# Patient Record
Sex: FEMALE | Race: BLACK OR AFRICAN AMERICAN | NOT HISPANIC OR LATINO | Employment: OTHER | URBAN - METROPOLITAN AREA
[De-identification: names, ages, dates, MRNs, and addresses within clinical notes are randomized per-mention and may not be internally consistent; named-entity substitution may affect disease eponyms.]

---

## 2017-01-30 ENCOUNTER — TELEPHONE (OUTPATIENT)
Dept: PODIATRY | Facility: CLINIC | Age: 82
End: 2017-01-30

## 2017-01-30 NOTE — TELEPHONE ENCOUNTER
----- Message from Patricia Perez sent at 1/30/2017  2:34 PM CST -----  Contact: romi [phone]  Need to speak with doctor in reference to another doctor.

## 2017-03-23 ENCOUNTER — OFFICE VISIT (OUTPATIENT)
Dept: PODIATRY | Facility: CLINIC | Age: 82
End: 2017-03-23
Payer: MEDICARE

## 2017-03-23 VITALS
HEART RATE: 81 BPM | SYSTOLIC BLOOD PRESSURE: 141 MMHG | DIASTOLIC BLOOD PRESSURE: 87 MMHG | WEIGHT: 140 LBS | HEIGHT: 60 IN | BODY MASS INDEX: 27.48 KG/M2

## 2017-03-23 DIAGNOSIS — L97.522 ISCHEMIC TOE ULCER, LEFT, WITH FAT LAYER EXPOSED: ICD-10-CM

## 2017-03-23 DIAGNOSIS — Z89.421 HISTORY OF AMPUTATION OF LESSER TOE OF RIGHT FOOT: ICD-10-CM

## 2017-03-23 DIAGNOSIS — L84 CORN OR CALLUS: ICD-10-CM

## 2017-03-23 DIAGNOSIS — I73.9 PVD (PERIPHERAL VASCULAR DISEASE): Primary | ICD-10-CM

## 2017-03-23 DIAGNOSIS — B35.1 ONYCHOMYCOSIS DUE TO DERMATOPHYTE: ICD-10-CM

## 2017-03-23 PROCEDURE — 11721 DEBRIDE NAIL 6 OR MORE: CPT | Mod: GW,59,Q8,S$GLB | Performed by: PODIATRIST

## 2017-03-23 PROCEDURE — 1160F RVW MEDS BY RX/DR IN RCRD: CPT | Mod: S$GLB,,, | Performed by: PODIATRIST

## 2017-03-23 PROCEDURE — 99999 PR PBB SHADOW E&M-EST. PATIENT-LVL III: CPT | Mod: PBBFAC,,, | Performed by: PODIATRIST

## 2017-03-23 PROCEDURE — 1126F AMNT PAIN NOTED NONE PRSNT: CPT | Mod: S$GLB,,, | Performed by: PODIATRIST

## 2017-03-23 PROCEDURE — 1159F MED LIST DOCD IN RCRD: CPT | Mod: S$GLB,,, | Performed by: PODIATRIST

## 2017-03-23 PROCEDURE — 11056 PARNG/CUTG B9 HYPRKR LES 2-4: CPT | Mod: GW,Q8,S$GLB, | Performed by: PODIATRIST

## 2017-03-23 PROCEDURE — 99213 OFFICE O/P EST LOW 20 MIN: CPT | Mod: GW,25,S$GLB, | Performed by: PODIATRIST

## 2017-03-23 PROCEDURE — 1157F ADVNC CARE PLAN IN RCRD: CPT | Mod: S$GLB,,, | Performed by: PODIATRIST

## 2017-03-23 NOTE — MR AVS SNAPSHOT
[facility]  [address]  [place] [address]  Phone: [phone]                  Ramona Grijalva   3/23/2017 3:15 PM   Office Visit    Description:  Female : 1920   Provider:  Christopher Egan DPM   Department:  [facility]           Reason for Visit     Diabetic Foot Exam     Nail Care     Foot Swelling           Diagnoses this Visit        Comments    PVD (peripheral vascular disease)    -  Primary     History of amputation of lesser toe of right foot         Ischemic toe ulcer, left, with fat layer exposed                To Do List           Future Appointments        Provider Department Dept Phone    6/15/2017 12:00 PM Delonte Santos MD [facility] - Internal Medicine [phone]    2017 1:00 PM Christopher Egan DPM [facility] [phone]      Goals (5 Years of Data)     None      [facility] On [facility]     [facility] On Call Nurse Care Line -  Assistance  Registered nurses in the [facility] On Call Center provide clinical advisement, health education, appointment booking, and other advisory services.  Call for this free service at [phone].             Medications           Message regarding Medications     Verify the changes and/or additions to your medication regime listed below are the same as discussed with your clinician today.  If any of these changes or additions are incorrect, please notify your healthcare provider.             Verify that the below list of medications is an accurate representation of the medications you are currently taking.  If none reported, the list may be blank. If incorrect, please contact your healthcare provider. Carry this list with you in case of emergency.           Current Medications     anastrozole (ARIMIDEX) 1 mg Tab Take 1 tablet by mouth Daily.    ascorbic acid (VITAMIN C) 500 MG tablet Take 500 mg by mouth once daily.    aspirin (ECOTRIN) 325 MG EC tablet Take 325 mg by mouth once daily.      bimatoprost (LUMIGAN) 0.03 %  ophthalmic drops Place 1 drop into both eyes every evening.      bisacodyl (DULCOLAX) 10 mg Supp Place 10 mg rectally daily as needed.      cilostazol (PLETAL) 50 MG Tab Take 2 tablets (100 mg total) by mouth 2 (two) times daily.    clopidogrel (PLAVIX) 75 mg tablet Take 75 mg by mouth once daily.      docusate sodium (COLACE) 50 MG capsule Take 50 mg by mouth once daily.    hydrochlorothiazide (MICROZIDE) 12.5 mg capsule Take 12.5 mg by mouth once daily.      losartan (COZAAR) 50 MG tablet Take 50 mg by mouth once daily.      simvastatin (ZOCOR) 40 MG tablet Take 40 mg by mouth every evening.      temazepam (RESTORIL) 15 mg Cap     tramadol (ULTRAM) 50 mg tablet     TRAVATAN Z 0.004 % Drop     levetiracetam (KEPPRA) 500 MG Tab Take 1 tablet (500 mg total) by mouth 2 (two) times daily.           Clinical Reference Information           Your Vitals Were     BP Pulse Height Weight BMI    141/87 81 5' (1.524 m) 63.5 kg (140 lb) 27.34 kg/m2      Blood Pressure          Most Recent Value    BP  (!)  141/87      Allergies as of 3/23/2017     No Known Allergies      Immunizations Administered on Date of Encounter - 3/23/2017     None      [facility] Sign-Up     Activating your [facility] account is as easy as 1-2-3!     1) Visit [email or web address], select Sign Up Now, enter this activation code and your date of birth, then select Next.  [email or web address]  Expires: 5/7/2017  3:26 PM      2) Create a username and password to use when you visit [facility] in the future and select a security question in case you lose your password and select Next.    3) Enter your e-mail address and click Sign Up!    Additional Information  If you have questions, please e-mail [email or web address] or call [phone] to talk to our [facility] staff. Remember, [facility] is NOT to be used for urgent needs. For medical emergencies, dial 911.         Language Assistance Services     ATTENTION: Language assistance services are available, free of charge.  Please call [phone].      ATENCIÓN: Si habla español, tiene a lópez disposición servicios gratuitos de asistencia lingüística. Llame al [phone].     CHÚ Ý: N?u b?n nói Ti?ng Vi?t, có các d?ch v? h? tr? ngôn ng? mi?n phí dành cho b?n. G?i s? [phone].         [facility] complies with applicable Federal civil rights laws and does not discriminate on the basis of race, color, national origin, age, disability, or sex.

## 2017-03-23 NOTE — PROGRESS NOTES
Subjective:     Ramona Grijalva is a 89 y.o. female  who presents to the clinic for evaluation and treatment of high risk feet. Ramona has a past medical history of Anticoagulant long-term use; Aortic valve sclerosis; Arthritis; Breast cancer; Claudication; Cough; Dizziness; Hypertension; Seizures; Stroke; Tachycardia; and URI (upper respiratory infection). The patient's chief complaint is elongated toenail and left great toe blister x 3 weeks.  Her daughter has been cleaning the area with hydrogen peroxide and applying Neosporin and cover the area with a bandaid.        This patient has documented high risk feet requiring routine maintenance secondary to peripheral vascular disease.   history of right 5th toe amputation.     9/27/16: follow up for right great toe ulcer. Patients family member states it was caused by cutting toenail too close previously. No other complaints today. Patient resting in wheelchair. Family member present.     03/23/2017: patient returns 6 months after previous appointment despite requiring recheck of R great toe wound 2 weeks after last appointment. Wound still present. Family helping clean feet and applying Neosporin and bandaid to the wound. Here for overall reassessment of wound and toenail trimming. Patient wheelchair bound due to weakness.     PCP:  Antoine Goodson MD   Date Last Seen by PCP:   Chief Complaint   Patient presents with    Diabetic Foot Exam    Nail Care    Foot Swelling     rt foot              Past Medical History:   Diagnosis Date    Anticoagulant long-term use     Aortic valve sclerosis     Arthritis     Breast cancer     right breast    Claudication     RIGHT LEG BLOCKAGE    Cough     Dizziness     Hypertension     Seizures     Stroke     Tachycardia     URI (upper respiratory infection)          Current Outpatient Prescriptions on File Prior to Visit   Medication Sig Dispense Refill    anastrozole (ARIMIDEX) 1 mg Tab Take 1 tablet by mouth Daily.       ascorbic acid (VITAMIN C) 500 MG tablet Take 500 mg by mouth once daily.      aspirin (ECOTRIN) 325 MG EC tablet Take 325 mg by mouth once daily.        bimatoprost (LUMIGAN) 0.03 % ophthalmic drops Place 1 drop into both eyes every evening.        bisacodyl (DULCOLAX) 10 mg Supp Place 10 mg rectally daily as needed.        cilostazol (PLETAL) 50 MG Tab Take 2 tablets (100 mg total) by mouth 2 (two) times daily. 60 tablet 3    clopidogrel (PLAVIX) 75 mg tablet Take 75 mg by mouth once daily.        docusate sodium (COLACE) 50 MG capsule Take 50 mg by mouth once daily.      hydrochlorothiazide (MICROZIDE) 12.5 mg capsule Take 12.5 mg by mouth once daily.        losartan (COZAAR) 50 MG tablet Take 50 mg by mouth once daily.        simvastatin (ZOCOR) 40 MG tablet Take 40 mg by mouth every evening.        temazepam (RESTORIL) 15 mg Cap       tramadol (ULTRAM) 50 mg tablet       TRAVATAN Z 0.004 % Drop       levetiracetam (KEPPRA) 500 MG Tab Take 1 tablet (500 mg total) by mouth 2 (two) times daily. 60 tablet 11     No current facility-administered medications on file prior to visit.          Review of patient's allergies indicates:  No Known Allergies          Review of Systems   Constitution: Negative for chills, fever and night sweats.   Cardiovascular: Positive for leg swelling. Negative for chest pain.   Skin: Positive for poor wound healing.   Musculoskeletal: Negative for joint pain and joint swelling.   Gastrointestinal: Positive for constipation (off and on).   Neurological: Positive for numbness and seizures.   Psychiatric/Behavioral: Positive for altered mental status and memory loss.           Objective:        Vitals:    03/23/17 1446   BP: (!) 141/87   Pulse: 81   Weight: 63.5 kg (140 lb)   Height: 5' (1.524 m)             Physical Exam   Constitutional: She appears well-developed and well-nourished. No distress.     Cardiovascular: Normal rate.  Dorsalis pedis and posterior tibial pulses are  non-palpable Bilaterally. Toes are cool to touch. Feet are warm proximally.There is decreased digital hair. Skin is very atrophic, hyperpigmented, and mildly edematous.     Musculoskeletal: Normal range of motion. Edema: mild in feet. Absent right fifth toe due to amputation. Wheelchair bound due to lower extremity weakness, generalized.      Neurological: She is alert. She exhibits normal muscle tone.   Neurologic: [place]-Pop 5.07 monofilamant testing is absent Pankaj feet. Sharp/dull sensation absent Bilaterally. Light touch absent Bilaterally.     Skin: Skin is warm and dry. No rash noted. She is not diaphoretic. No erythema. No pallor.     Skin shiny, thin, atrophic with loss of pedal hair b/l.   Toenails 1-5 L and 1-4 R elongated, dystrophic, discolored with subungual debris. Hyperkeratotic lesion noted to lateral distal aspect of b/l 5th digits adjacent to toenails. Stable without open wounds at 5th toes.     Small wound noted to distal tip of right hallux just distal to nail plate. Fibrous wound bed measuring 0.5 x 0.5 x 0.3cm down to level of subcutaneous tissue. No erythema, odor or drainage noted. No signs of infection.             Assessment / Plan:       Orrajat was seen today for diabetic foot exam, nail care and foot swelling.    Diagnoses and all orders for this visit:    PVD (peripheral vascular disease)    History of amputation of lesser toe of right foot    Ischemic toe ulcer, left, with fat layer exposed    Onychomycosis due to dermatophyte    Corn or callus        · Wound cleaned with sterile normal saline; Betadine applied and bandaid.  Change daily at home. Get OTC betadine and cotton balls to apply. Do not wrap toe with any dressing. Apply loose bandaid over toe, do not wrap around toe.   · Protect feet to prevent injury   Monitor wound daily for problems. Return immediately if any signs of infection are encountered including pus, drainage, odor, severe pain, fever, chills.   · Utilize Darco  shoe at all times for added protection.   · With patient's permission, nails were aggressively reduced and debrided 1,2,3,4, 5 R and 1,2, 3,4,5 L and filed to their soft tissue attachment mechanically and with electric , removing all offending nail and debris. Utilizing a #15 scalpel, I trimmed the corns and calluses at the lateral b/l 5th toe adjacent to nail bed.    Patient tolerated this well and no blood was drawn. Patient reports relief following the procedure.       F/u 2 months, sooner PRN for follow up left great toe ulcer. With hx of PAD, patient high risk for losing toe if this becomes infected. Will keep close eye on toe. She has had previous vascular interventions. Will monitor toe and consider vascular referral if no improvement at next visit.   Assistance provided in finding new PCP at [facility]. Set up with Dr. Jean Baptiste.

## 2017-06-15 ENCOUNTER — OFFICE VISIT (OUTPATIENT)
Dept: INTERNAL MEDICINE | Facility: CLINIC | Age: 82
End: 2017-06-15
Payer: MEDICARE

## 2017-06-15 VITALS
WEIGHT: 140 LBS | SYSTOLIC BLOOD PRESSURE: 118 MMHG | HEIGHT: 60 IN | DIASTOLIC BLOOD PRESSURE: 72 MMHG | BODY MASS INDEX: 27.48 KG/M2

## 2017-06-15 DIAGNOSIS — I10 ESSENTIAL HYPERTENSION: Primary | ICD-10-CM

## 2017-06-15 DIAGNOSIS — F03.91 DEMENTIA WITH BEHAVIORAL DISTURBANCE, UNSPECIFIED DEMENTIA TYPE: ICD-10-CM

## 2017-06-15 DIAGNOSIS — I63.9 CEREBROVASCULAR ACCIDENT (CVA), UNSPECIFIED MECHANISM: ICD-10-CM

## 2017-06-15 PROCEDURE — 1159F MED LIST DOCD IN RCRD: CPT | Mod: S$GLB,,, | Performed by: INTERNAL MEDICINE

## 2017-06-15 PROCEDURE — 99204 OFFICE O/P NEW MOD 45 MIN: CPT | Mod: GW,S$GLB,, | Performed by: INTERNAL MEDICINE

## 2017-06-15 PROCEDURE — 99999 PR PBB SHADOW E&M-EST. PATIENT-LVL III: CPT | Mod: PBBFAC,,, | Performed by: INTERNAL MEDICINE

## 2017-06-15 PROCEDURE — 1126F AMNT PAIN NOTED NONE PRSNT: CPT | Mod: S$GLB,,, | Performed by: INTERNAL MEDICINE

## 2017-06-15 RX ORDER — SIMVASTATIN 40 MG/1
40 TABLET, FILM COATED ORAL NIGHTLY
Qty: 90 TABLET | Refills: 11 | Status: SHIPPED | OUTPATIENT
Start: 2017-06-15 | End: 2018-07-29 | Stop reason: SDUPTHER

## 2017-06-15 RX ORDER — LOSARTAN POTASSIUM 25 MG/1
25 TABLET ORAL DAILY
Qty: 90 TABLET | Refills: 11 | Status: SHIPPED | OUTPATIENT
Start: 2017-06-15 | End: 2018-08-24 | Stop reason: SDUPTHER

## 2017-06-15 RX ORDER — LOSARTAN POTASSIUM 25 MG/1
TABLET ORAL
COMMUNITY
Start: 2017-06-03 | End: 2017-06-15 | Stop reason: SDUPTHER

## 2017-06-15 RX ORDER — LACTULOSE 10 G/15ML
SOLUTION ORAL; RECTAL
Refills: 0 | COMMUNITY
Start: 2017-05-24

## 2017-06-15 NOTE — PROGRESS NOTES
Subjective:       Patient ID: Ramona Grijalva is a 89 y.o. female.    Chief Complaint: Establish Care    Had a recent fall. No complaints or inj from.    Gets agitated at times from dementia.    Has concerned care/hospice care for dementia.      Review of Systems   Constitutional: Negative for activity change, fatigue, fever and unexpected weight change.   HENT: Negative for congestion.    Respiratory: Negative for cough, choking, chest tightness and shortness of breath.    Cardiovascular: Negative for chest pain, palpitations and leg swelling.   Gastrointestinal: Negative for abdominal distention and abdominal pain.   Genitourinary: Negative for decreased urine volume.   Musculoskeletal: Positive for gait problem. Negative for arthralgias and back pain.   Skin: Negative for rash.   Neurological: Positive for weakness (from strokes).   Psychiatric/Behavioral: Positive for agitation (easily calms down), behavioral problems and confusion. Negative for dysphoric mood.       Objective:      Physical Exam   Constitutional: She appears well-developed and well-nourished. No distress.   Calm in wheelchair, minimal responses to questions, unintelligible   HENT:   Head: Normocephalic and atraumatic.   Mouth/Throat: No oropharyngeal exudate.   Eyes: EOM are normal. Pupils are equal, round, and reactive to light. Left eye exhibits no discharge.   Cardiovascular: Normal rate, regular rhythm and normal heart sounds.    Pulmonary/Chest: Effort normal and breath sounds normal.   Abdominal: Soft. Bowel sounds are normal. She exhibits no distension. There is no tenderness.   Neurological: She is alert.   L sided hemiparesis, mild R sided as well, but L>R.    Does not speak for herself.     unintelligible   Skin: No rash noted. She is not diaphoretic.   Psychiatric: She has a normal mood and affect. Her behavior is normal.       Assessment:       1. Essential hypertension    2. Cerebrovascular accident (CVA), unspecified mechanism    3.  Dementia with behavioral disturbance, unspecified dementia type        Plan:       Ramona was seen today for establish care.    Diagnoses and all orders for this visit:    Essential hypertension  -     losartan (COZAAR) 25 MG tablet; Take 1 tablet (25 mg total) by mouth once daily.  At goal    Cerebrovascular accident (CVA), unspecified mechanism  -     losartan (COZAAR) 25 MG tablet; Take 1 tablet (25 mg total) by mouth once daily.  -     simvastatin (ZOCOR) 40 MG tablet; Take 1 tablet (40 mg total) by mouth every evening.  -     CBC auto differential; Future  -     Comprehensive metabolic panel; Future  -     Lipid panel; Future    Dementia with behavioral disturbance, unspecified dementia type  Vascular dementia    Health Maintenance       Date Due Completion Date    Eye Exam 01/20/1930 ---    TETANUS VACCINE 01/20/1938 ---    Zoster Vaccine 01/20/1980 ---    Pneumococcal (65+) (1 of 2 - PCV13) 01/20/1985 ---    Hemoglobin A1c 10/25/2013 4/25/2013    Foot Exam 06/15/2017 6/15/2016    Influenza Vaccine 08/01/2017 11/19/2013    Lipid Panel 06/15/2018 6/15/2017      Check in re vaccines next time      Return in about 6 months (around 12/15/2017).'

## 2017-06-21 ENCOUNTER — TELEPHONE (OUTPATIENT)
Dept: INTERNAL MEDICINE | Facility: CLINIC | Age: 82
End: 2017-06-21

## 2017-06-21 NOTE — TELEPHONE ENCOUNTER
----- Message from Vanda Ni sent at 6/21/2017  2:04 PM CDT -----  Contact: [facility]/jamaal/[phone]  Nurse called in regards to getting a copy of the pt lab work. She has a visit with them on tomorrow. Fax number [phone]        Please advise

## 2017-06-27 ENCOUNTER — OFFICE VISIT (OUTPATIENT)
Dept: PODIATRY | Facility: CLINIC | Age: 82
End: 2017-06-27
Payer: MEDICARE

## 2017-06-27 VITALS
DIASTOLIC BLOOD PRESSURE: 68 MMHG | TEMPERATURE: 98 F | WEIGHT: 139 LBS | SYSTOLIC BLOOD PRESSURE: 120 MMHG | HEART RATE: 90 BPM | HEIGHT: 60 IN | BODY MASS INDEX: 27.29 KG/M2

## 2017-06-27 DIAGNOSIS — R60.0 BILATERAL LOWER EXTREMITY EDEMA: ICD-10-CM

## 2017-06-27 DIAGNOSIS — B35.1 ONYCHOMYCOSIS DUE TO DERMATOPHYTE: ICD-10-CM

## 2017-06-27 DIAGNOSIS — L97.522 TOE ULCER, LEFT, WITH FAT LAYER EXPOSED: ICD-10-CM

## 2017-06-27 DIAGNOSIS — I73.9 PAD (PERIPHERAL ARTERY DISEASE): Primary | ICD-10-CM

## 2017-06-27 DIAGNOSIS — L84 CORN OR CALLUS: ICD-10-CM

## 2017-06-27 PROCEDURE — 99999 PR PBB SHADOW E&M-EST. PATIENT-LVL III: CPT | Mod: PBBFAC,,, | Performed by: PODIATRIST

## 2017-06-27 PROCEDURE — 11056 PARNG/CUTG B9 HYPRKR LES 2-4: CPT | Mod: Q7,GW,S$GLB, | Performed by: PODIATRIST

## 2017-06-27 PROCEDURE — 11721 DEBRIDE NAIL 6 OR MORE: CPT | Mod: 59,Q7,GW,S$GLB | Performed by: PODIATRIST

## 2017-06-27 PROCEDURE — 99499 UNLISTED E&M SERVICE: CPT | Mod: S$GLB,,, | Performed by: PODIATRIST

## 2017-06-28 NOTE — PROGRESS NOTES
Subjective:     Ramona Grijalva is a 89 y.o. female  who presents to the clinic for evaluation and treatment of high risk feet. Ramona has a past medical history of Anticoagulant long-term use; Aortic valve sclerosis; Arthritis; Breast cancer; Claudication; Cough; Dizziness; Hypertension; Seizures; Stroke; Tachycardia; and URI (upper respiratory infection). The patient's chief complaint is follow up for left great toe ulceration. Remains stable but open per patients family member.  Her daughter has been cleaning the area with hydrogen peroxide and applying Neosporin and cover the area with a bandaid from time to time. Also here for toenail trimming as they are elongated and beginning to bother her with pain.         PCP:  Delonte Santos MD   Date Last Seen by PCP:   Chief Complaint   Patient presents with    PCP     Danielle 06/15/2017    Follow-up              Past Medical History:   Diagnosis Date    Anticoagulant long-term use     Aortic valve sclerosis     Arthritis     Breast cancer     right breast    Claudication     RIGHT LEG BLOCKAGE    Cough     Dementia     Dizziness     Hypertension     Seizures     Stroke     L sided hemiparesis    Tachycardia     URI (upper respiratory infection)          Current Outpatient Prescriptions on File Prior to Visit   Medication Sig Dispense Refill    anastrozole (ARIMIDEX) 1 mg Tab Take 1 tablet by mouth Daily.      aspirin (ECOTRIN) 325 MG EC tablet Take 325 mg by mouth once daily.        bimatoprost (LUMIGAN) 0.03 % ophthalmic drops Place 1 drop into both eyes every evening.        bisacodyl (DULCOLAX) 10 mg Supp Place 10 mg rectally daily as needed.        lactulose (CHRONULAC) 10 gram/15 mL solution TAKE 30ml BY MOUTH DAILY as needed FOR CONSTIPATION  0    losartan (COZAAR) 25 MG tablet Take 1 tablet (25 mg total) by mouth once daily. 90 tablet 11    simvastatin (ZOCOR) 40 MG tablet Take 1 tablet (40 mg total) by mouth every evening. 90 tablet 11     temazepam (RESTORIL) 15 mg Cap       TRAVATAN Z 0.004 % Drop        No current facility-administered medications on file prior to visit.          Review of patient's allergies indicates:  No Known Allergies          Review of Systems   Constitution: Negative for chills, fever and night sweats.   Cardiovascular: Positive for leg swelling. Negative for chest pain.   Skin: Positive for poor wound healing.   Musculoskeletal: Negative for joint pain and joint swelling.   Gastrointestinal: Positive for constipation (off and on).   Neurological: Positive for numbness and seizures.   Psychiatric/Behavioral: Positive for altered mental status and memory loss.           Objective:        Vitals:    06/27/17 1335   BP: 120/68   Pulse: 90   Temp: 98.3 °F (36.8 °C)   TempSrc: Oral   Weight: 63 kg (139 lb)   Height: 5' (1.524 m)             Physical Exam   Constitutional: She appears well-developed and well-nourished. No distress.     Cardiovascular: Normal rate.  Dorsalis pedis and posterior tibial pulses are non-palpable Bilaterally. Toes are cool to touch. Feet are warm proximally.There is decreased digital hair. Skin is very atrophic, hyperpigmented, and mildly edematous.     Musculoskeletal: Normal range of motion. Edema: mild in feet. Absent right fifth toe due to amputation. Wheelchair bound due to lower extremity weakness, generalized.      Neurological: She is alert. She exhibits normal muscle tone.   Neurologic: [place]-Pop 5.07 monofilamant testing is absent Pankaj feet. Sharp/dull sensation absent Bilaterally. Light touch absent Bilaterally.     Skin: Skin is warm and dry. No rash noted. She is not diaphoretic. No erythema. No pallor.     Skin shiny, thin, atrophic with loss of pedal hair b/l.   Toenails 1-5 L and 1-4 R elongated, dystrophic, discolored with subungual debris. Hyperkeratotic lesion noted to lateral distal aspect of b/l 5th digits adjacent to toenails. Stable without open wounds at 5th toes.      Small wound noted to distal tip of right hallux just distal to nail plate. Fibrous wound bed measuring 0.5 x 0.5 x 0.3cm down to level of subcutaneous tissue. No erythema, odor or drainage noted. No signs of infection. Surrounding skin hyperpigmented, cold, dry, atrophic.             Assessment / Plan:       Ramona was seen today for pcp and follow-up.    Diagnoses and all orders for this visit:    PAD (peripheral artery disease)    Bilateral lower extremity edema    Onychomycosis due to dermatophyte    Corn or callus    Toe ulcer, left, with fat layer exposed        · Wound cleaned with sterile normal saline; Betadine applied and bandaid.  Change daily at home. Get OTC betadine and cotton balls to apply. Do not wrap toe with any dressing. Apply loose bandaid over toe, do not wrap around toe.   · Protect feet to prevent injury   Monitor wound daily for problems. Return immediately if any signs of infection are encountered including pus, drainage, odor, severe pain, fever, chills.   · Utilize Darco shoe at all times for added protection.   · With patient's permission, nails were aggressively reduced and debrided 1,2,3,4, 5 R and 1,2, 3,4,5 L and filed to their soft tissue attachment mechanically and with electric , removing all offending nail and debris. Utilizing a #15 scalpel, I trimmed the corns and calluses at the lateral b/l 5th toe adjacent to nail bed.    Patient tolerated this well and no blood was drawn. Patient reports relief following the procedure.       F/u 2 months, sooner PRN for follow up left great toe ulcer. With hx of PAD, patient high risk for losing toe if this becomes infected. Will keep close eye on toe. She has had previous vascular interventions. Will monitor toe and consider vascular referral if no improvement at next visit.   Assistance provided in finding new PCP at [facility]. Set up with Dr. Jean Baptiste.

## 2017-08-03 ENCOUNTER — OFFICE VISIT (OUTPATIENT)
Dept: PODIATRY | Facility: CLINIC | Age: 82
End: 2017-08-03
Payer: MEDICARE

## 2017-08-03 VITALS
SYSTOLIC BLOOD PRESSURE: 120 MMHG | DIASTOLIC BLOOD PRESSURE: 64 MMHG | WEIGHT: 139 LBS | HEART RATE: 88 BPM | BODY MASS INDEX: 27.29 KG/M2 | HEIGHT: 60 IN

## 2017-08-03 DIAGNOSIS — I73.9 PAD (PERIPHERAL ARTERY DISEASE): Primary | ICD-10-CM

## 2017-08-03 DIAGNOSIS — L97.522 TOE ULCER, LEFT, WITH FAT LAYER EXPOSED: ICD-10-CM

## 2017-08-03 PROCEDURE — 87076 CULTURE ANAEROBE IDENT EACH: CPT

## 2017-08-03 PROCEDURE — 1159F MED LIST DOCD IN RCRD: CPT | Mod: S$GLB,,, | Performed by: PODIATRIST

## 2017-08-03 PROCEDURE — 1126F AMNT PAIN NOTED NONE PRSNT: CPT | Mod: S$GLB,,, | Performed by: PODIATRIST

## 2017-08-03 PROCEDURE — 3008F BODY MASS INDEX DOCD: CPT | Mod: S$GLB,,, | Performed by: PODIATRIST

## 2017-08-03 PROCEDURE — 99213 OFFICE O/P EST LOW 20 MIN: CPT | Mod: GW,S$GLB,, | Performed by: PODIATRIST

## 2017-08-03 PROCEDURE — 99999 PR PBB SHADOW E&M-EST. PATIENT-LVL III: CPT | Mod: PBBFAC,,, | Performed by: PODIATRIST

## 2017-08-03 PROCEDURE — 87075 CULTR BACTERIA EXCEPT BLOOD: CPT

## 2017-08-03 PROCEDURE — 87070 CULTURE OTHR SPECIMN AEROBIC: CPT

## 2017-08-03 NOTE — PROGRESS NOTES
Subjective:     Ramona Grijalva is a 89 y.o. female  who presents to the clinic for evaluation and treatment of high risk feet. Ramona has a past medical history of Anticoagulant long-term use; Aortic valve sclerosis; Arthritis; Breast cancer; Claudication; Cough; Dizziness; Hypertension; Seizures; Stroke; Tachycardia; and URI (upper respiratory infection). The patient's chief complaint is follow up for left great toe ulceration. Remains stable but open per patients family member.  Her daughter has been cleaning the area with hydrogen peroxide and applying betadine and neosporin. She has been leaving it open to air dry most of the time. No other pedal complaints today.         PCP:  Delonte Santos MD   Date Last Seen by PCP:   Chief Complaint   Patient presents with    PAD (peripheral artery disease     bilateral    toe ulcer     lt great  toe              Past Medical History:   Diagnosis Date    Anticoagulant long-term use     Aortic valve sclerosis     Arthritis     Breast cancer     right breast    Claudication     RIGHT LEG BLOCKAGE    Cough     Dementia     Dizziness     Hypertension     Seizures     Stroke     L sided hemiparesis    Tachycardia     URI (upper respiratory infection)          Current Outpatient Prescriptions on File Prior to Visit   Medication Sig Dispense Refill    anastrozole (ARIMIDEX) 1 mg Tab Take 1 tablet by mouth Daily.      aspirin (ECOTRIN) 325 MG EC tablet Take 325 mg by mouth once daily.        bimatoprost (LUMIGAN) 0.03 % ophthalmic drops Place 1 drop into both eyes every evening.        bisacodyl (DULCOLAX) 10 mg Supp Place 10 mg rectally daily as needed.        lactulose (CHRONULAC) 10 gram/15 mL solution TAKE 30ml BY MOUTH DAILY as needed FOR CONSTIPATION  0    losartan (COZAAR) 25 MG tablet Take 1 tablet (25 mg total) by mouth once daily. 90 tablet 11    simvastatin (ZOCOR) 40 MG tablet Take 1 tablet (40 mg total) by mouth every evening. 90 tablet 11    temazepam  (RESTORIL) 15 mg Cap       TRAVATAN Z 0.004 % Drop        No current facility-administered medications on file prior to visit.          Review of patient's allergies indicates:  No Known Allergies          Review of Systems   Constitution: Negative for chills, fever and night sweats.   Cardiovascular: Positive for leg swelling. Negative for chest pain.   Skin: Positive for poor wound healing.   Musculoskeletal: Negative for joint pain and joint swelling.   Gastrointestinal: Positive for constipation (off and on).   Neurological: Positive for numbness and seizures.   Psychiatric/Behavioral: Positive for altered mental status and memory loss.           Objective:        Vitals:    08/03/17 1308   BP: 120/64   Pulse: 88   Weight: 63 kg (139 lb)   Height: 5' (1.524 m)             Physical Exam   Constitutional: She appears well-developed and well-nourished. No distress.     Cardiovascular: Normal rate.  Dorsalis pedis and posterior tibial pulses are non-palpable Bilaterally. Toes are cool to touch. Feet are warm proximally.There is decreased digital hair. Skin is very atrophic, hyperpigmented, and mildly edematous.     Musculoskeletal: Normal range of motion. Edema: mild in feet. Absent right fifth toe due to amputation. Wheelchair bound due to lower extremity weakness, generalized.      Neurological: She is alert. She exhibits normal muscle tone.   Neurologic: [place]-Pop 5.07 monofilamant testing is absent Pankaj feet. Sharp/dull sensation absent Bilaterally. Light touch absent Bilaterally.     Skin: Skin is warm and dry. No rash noted. She is not diaphoretic. No erythema. No pallor.     Skin shiny, thin, atrophic with loss of pedal hair b/l.   Toenails 1-5 L and 1-4 R elongated, dystrophic, discolored with subungual debris. Hyperkeratotic lesion noted to lateral distal aspect of b/l 5th digits adjacent to toenails. Stable without open wounds at 5th toes.     Small wound noted to distal tip of right hallux just  distal to nail plate. Fibrous wound bed measuring 0.5 x 0.5 x 0.3cm down to level of deep subcutaneous tissue. No erythema, odor or drainage noted. No signs of infection. Surrounding skin hyperpigmented, cold, dry, atrophic.             Assessment / Plan:       Ramona was seen today for pad (peripheral artery disease and toe ulcer.    Diagnoses and all orders for this visit:    PAD (peripheral artery disease)  -     Aerobic culture  -     Culture, Anaerobic    Toe ulcer, left, with fat layer exposed  -     Aerobic culture  -     Culture, Anaerobic    Other orders  -     cadexomer iodine (IODOSORB) 0.9 % gel; Apply topically daily as needed for Wound Care.        · Wound cleaned with sterile normal saline; Iodosorb applied and bandaid.  Change daily at home. Get OTC betadine and cotton balls to apply. Do not wrap toe with any dressing. Apply loose bandaid over toe, do not wrap around toe. Rx Iodosorb sent to pharmacy.   · Protect feet to prevent injury   Monitor wound daily for problems. Return immediately if any signs of infection are encountered including pus, drainage, odor, severe pain, fever, chills.   · Utilize Darco shoe at all times for added protection.       F/u 3 weeks, sooner PRN for follow up left great toe ulcer. With hx of PAD, patient high risk for losing toe if this becomes infected. Will keep close eye on toe. She has had previous vascular interventions. Will monitor toe and consider vascular referral if no improvement at next visit.     Too soon for nail trimming. Will do this at next visit.

## 2017-08-05 LAB — BACTERIA SPEC AEROBE CULT: NORMAL

## 2017-08-07 LAB — BACTERIA SPEC ANAEROBE CULT: NORMAL

## 2017-08-07 RX ORDER — CLINDAMYCIN HYDROCHLORIDE 300 MG/1
300 CAPSULE ORAL 3 TIMES DAILY
Qty: 30 CAPSULE | Refills: 0 | Status: SHIPPED | OUTPATIENT
Start: 2017-08-07 | End: 2017-08-17

## 2017-08-08 ENCOUNTER — TELEPHONE (OUTPATIENT)
Dept: PODIATRY | Facility: CLINIC | Age: 82
End: 2017-08-08

## 2017-08-08 NOTE — TELEPHONE ENCOUNTER
----- Message from Christopher Egan DPM sent at 8/7/2017  5:35 PM CDT -----  Can you call this patient and let her know I prescribed 10 days worth of Clindamycin. Thanks.

## 2017-09-07 ENCOUNTER — OFFICE VISIT (OUTPATIENT)
Dept: PODIATRY | Facility: CLINIC | Age: 82
End: 2017-09-07
Payer: MEDICARE

## 2017-09-07 VITALS
BODY MASS INDEX: 27.29 KG/M2 | HEIGHT: 60 IN | HEART RATE: 86 BPM | SYSTOLIC BLOOD PRESSURE: 140 MMHG | WEIGHT: 139 LBS | DIASTOLIC BLOOD PRESSURE: 82 MMHG

## 2017-09-07 DIAGNOSIS — Z89.421 H/O AMPUTATION OF LESSER TOE, RIGHT: ICD-10-CM

## 2017-09-07 DIAGNOSIS — L84 PRE-ULCERATIVE CALLUSES: ICD-10-CM

## 2017-09-07 DIAGNOSIS — I73.9 PAD (PERIPHERAL ARTERY DISEASE): Primary | ICD-10-CM

## 2017-09-07 DIAGNOSIS — L97.521 TOE ULCER, LEFT, LIMITED TO BREAKDOWN OF SKIN: ICD-10-CM

## 2017-09-07 DIAGNOSIS — B35.1 ONYCHOMYCOSIS DUE TO DERMATOPHYTE: ICD-10-CM

## 2017-09-07 DIAGNOSIS — R60.0 BILATERAL LOWER EXTREMITY EDEMA: ICD-10-CM

## 2017-09-07 PROCEDURE — 11721 DEBRIDE NAIL 6 OR MORE: CPT | Mod: Q7,GW,HPC,S$GLB | Performed by: PODIATRIST

## 2017-09-07 PROCEDURE — 99999 PR PBB SHADOW E&M-EST. PATIENT-LVL II: CPT | Mod: PBBFAC,,, | Performed by: PODIATRIST

## 2017-09-07 PROCEDURE — 99499 UNLISTED E&M SERVICE: CPT | Mod: S$GLB,,, | Performed by: PODIATRIST

## 2017-09-07 NOTE — PROGRESS NOTES
Subjective:     Ramona Grijalva is a 89 y.o. female  who presents to the clinic for evaluation and treatment of high risk feet. Ramona has a past medical history of Anticoagulant long-term use; Aortic valve sclerosis; Arthritis; Breast cancer; Claudication; Cough; Dizziness; Hypertension; Seizures; Stroke; Tachycardia; and URI (upper respiratory infection). The patient's chief complaint is follow up for left great toe ulceration. Remains stable but open per patients family member.  Nurse aid applying Iodosorb daily as directed and has been leaving it open to air dry most of the time. Also relates elongated toenails in need of trimming today.         PCP:  Delonte Santos MD   Date Last Seen by PCP: 6/16/17  Chief Complaint   Patient presents with    PAD (peripheral artery disease)     lt great toe              Past Medical History:   Diagnosis Date    Anticoagulant long-term use     Aortic valve sclerosis     Arthritis     Breast cancer     right breast    Claudication     RIGHT LEG BLOCKAGE    Cough     Dementia     Dizziness     Hypertension     Seizures     Stroke     L sided hemiparesis    Tachycardia     URI (upper respiratory infection)          Current Outpatient Prescriptions on File Prior to Visit   Medication Sig Dispense Refill    anastrozole (ARIMIDEX) 1 mg Tab Take 1 tablet by mouth Daily.      aspirin (ECOTRIN) 325 MG EC tablet Take 325 mg by mouth once daily.        bimatoprost (LUMIGAN) 0.03 % ophthalmic drops Place 1 drop into both eyes every evening.        bisacodyl (DULCOLAX) 10 mg Supp Place 10 mg rectally daily as needed.        cadexomer iodine (IODOSORB) 0.9 % gel Apply topically daily as needed for Wound Care. 40 g 2    lactulose (CHRONULAC) 10 gram/15 mL solution TAKE 30ml BY MOUTH DAILY as needed FOR CONSTIPATION  0    losartan (COZAAR) 25 MG tablet Take 1 tablet (25 mg total) by mouth once daily. 90 tablet 11    simvastatin (ZOCOR) 40 MG tablet Take 1 tablet (40 mg total) by  mouth every evening. 90 tablet 11    temazepam (RESTORIL) 15 mg Cap       TRAVATAN Z 0.004 % Drop        No current facility-administered medications on file prior to visit.          Review of patient's allergies indicates:  No Known Allergies          Review of Systems   Constitution: Negative for chills, fever and night sweats.   Cardiovascular: Positive for leg swelling. Negative for chest pain.   Skin: Positive for poor wound healing.   Musculoskeletal: Negative for joint pain and joint swelling.   Gastrointestinal: Positive for constipation (off and on).   Neurological: Positive for numbness and seizures.   Psychiatric/Behavioral: Positive for altered mental status and memory loss.           Objective:        Vitals:    09/07/17 1408   BP: (!) 140/82   Pulse: 86   Weight: 63 kg (139 lb)   Height: 5' (1.524 m)             Physical Exam   Constitutional: She appears well-developed and well-nourished. No distress.     Cardiovascular: Normal rate.  Dorsalis pedis and posterior tibial pulses are non-palpable Bilaterally. Toes are cool to touch. Feet are warm proximally.There is decreased digital hair. Skin is very atrophic, hyperpigmented, and mildly edematous.     Musculoskeletal: Normal range of motion. Edema: mild in feet. Absent right fifth toe due to amputation. Wheelchair bound due to lower extremity weakness, generalized.      Area of mild focal edema noted to dorsal aspect of the right 5th metatarsal--appears to be retracted EDL tendon from previous 5th toe amp. No pain on palpation or manipulation.     Neurological: She is alert. She exhibits normal muscle tone.   Neurologic: [place]-Pop 5.07 monofilamant testing is absent Pankaj feet. Sharp/dull sensation absent Bilaterally. Light touch absent Bilaterally.     Skin: Skin is warm and dry. No rash noted. She is not diaphoretic. No erythema. No pallor.     Skin shiny, thin, atrophic with loss of pedal hair b/l.   Toenails 1-5 L and 1-4 R elongated,  dystrophic, discolored with subungual debris. Hyperkeratotic lesion noted to lateral distal aspect of b/l 5th digits adjacent to toenails. Stable without open wounds at 5th toes.     Small wound noted to distal tip of right hallux just distal to nail plate. Eschar wound bed measuring 0.5 x 0.5 x 0.1cm down to level of dermis. No erythema, odor or drainage noted. No signs of infection. Surrounding skin hyperpigmented, cold, dry, atrophic.             Assessment / Plan:       Ramona was seen today for pad (peripheral artery disease).    Diagnoses and all orders for this visit:    PAD (peripheral artery disease)    Pre-ulcerative calluses    Onychomycosis due to dermatophyte    Bilateral lower extremity edema    Toe ulcer, left, limited to breakdown of skin    H/O amputation of lesser toe, right        Wound cleaned with sterile normal saline. No excisional debridement at this time.  Triple abx ointment applied and bandaid.  Change daily at home with Iodosorb. Advised not to wrap toe with any dressing. Apply loose bandaid over toe, do not wrap around toe.     Protect feet to prevent injury   Monitor wound daily for problems. Return immediately if any signs of infection are encountered including pus, drainage, odor, severe pain, fever, chills.     With patient's permission, nails were aggressively reduced and debrided 1,2,3,4, 5 R and 1,2, 3,4,5 L and filed to their soft tissue attachment mechanically and with electric , removing all offending nail and debris. Patient tolerated this well and no blood was drawn. Patient reports relief following the procedure.     Utilize Darco shoe at all times for added protection.     F/u 9 weeks, sooner PRN for routine follow up. Monitor toe daily. Return sooner/call immediatly if any problems arise. With hx of PAD, patient high risk for losing toe if this becomes infected. Will keep close eye on toe. She has had previous vascular interventions. Improvement noted. Continue local  care for now.     Small puffiness along the dorsal lateral forefoot is likely the patients EDL tendon (from amputated 5th toe) retracted in that area. Non painful non symptomatic at this time. Monitor for any problems or changes.

## 2017-11-14 ENCOUNTER — OFFICE VISIT (OUTPATIENT)
Dept: PODIATRY | Facility: CLINIC | Age: 82
End: 2017-11-14
Payer: MEDICARE

## 2017-11-14 VITALS
SYSTOLIC BLOOD PRESSURE: 109 MMHG | HEART RATE: 89 BPM | WEIGHT: 139 LBS | DIASTOLIC BLOOD PRESSURE: 75 MMHG | BODY MASS INDEX: 27.29 KG/M2 | HEIGHT: 60 IN

## 2017-11-14 DIAGNOSIS — Z89.421 HISTORY OF AMPUTATION OF LESSER TOE OF RIGHT FOOT: ICD-10-CM

## 2017-11-14 DIAGNOSIS — B35.1 ONYCHOMYCOSIS DUE TO DERMATOPHYTE: ICD-10-CM

## 2017-11-14 DIAGNOSIS — L84 PRE-ULCERATIVE CALLUSES: ICD-10-CM

## 2017-11-14 DIAGNOSIS — R60.0 BILATERAL LOWER EXTREMITY EDEMA: ICD-10-CM

## 2017-11-14 DIAGNOSIS — I73.9 PAD (PERIPHERAL ARTERY DISEASE): Primary | ICD-10-CM

## 2017-11-14 PROCEDURE — 11055 PARING/CUTG B9 HYPRKER LES 1: CPT | Mod: GW,Q7,S$GLB, | Performed by: PODIATRIST

## 2017-11-14 PROCEDURE — 99499 UNLISTED E&M SERVICE: CPT | Mod: S$GLB,,, | Performed by: PODIATRIST

## 2017-11-14 PROCEDURE — 99999 PR PBB SHADOW E&M-EST. PATIENT-LVL II: CPT | Mod: PBBFAC,,, | Performed by: PODIATRIST

## 2017-11-14 PROCEDURE — 11721 DEBRIDE NAIL 6 OR MORE: CPT | Mod: GW,59,Q7,S$GLB | Performed by: PODIATRIST

## 2017-11-15 NOTE — PROGRESS NOTES
Subjective:     Ramona Grijalva is a 89 y.o. female  who presents to the clinic for evaluation and treatment of high risk feet. Ramona has a past medical history of Anticoagulant long-term use; Aortic valve sclerosis; Arthritis; Breast cancer; Claudication; Cough; Dizziness; Hypertension; Seizures; Stroke; Tachycardia; and URI (upper respiratory infection). The patient's chief complaint is follow up for left great toe ulceration. Remains stable with no complications.  Nurse aid or family members applying betadine daily as directed and they have been leaving it open to air dry most of the time. Also relates elongated toenails in need of trimming today. No other pedal complaints today.         PCP:  Delonte Santos MD   Date Last Seen by PCP:  Chief Complaint   Patient presents with    PCP     Danielle 06/15/2017    Follow-up     9 weeks f/u               Past Medical History:   Diagnosis Date    Anticoagulant long-term use     Aortic valve sclerosis     Arthritis     Breast cancer     right breast    Claudication     RIGHT LEG BLOCKAGE    Cough     Dementia     Dizziness     Hypertension     Seizures     Stroke     L sided hemiparesis    Tachycardia     URI (upper respiratory infection)          Current Outpatient Prescriptions on File Prior to Visit   Medication Sig Dispense Refill    anastrozole (ARIMIDEX) 1 mg Tab Take 1 tablet by mouth Daily.      aspirin (ECOTRIN) 325 MG EC tablet Take 325 mg by mouth once daily.        bimatoprost (LUMIGAN) 0.03 % ophthalmic drops Place 1 drop into both eyes every evening.        bisacodyl (DULCOLAX) 10 mg Supp Place 10 mg rectally daily as needed.        cadexomer iodine (IODOSORB) 0.9 % gel Apply topically daily as needed for Wound Care. 40 g 2    lactulose (CHRONULAC) 10 gram/15 mL solution TAKE 30ml BY MOUTH DAILY as needed FOR CONSTIPATION  0    losartan (COZAAR) 25 MG tablet Take 1 tablet (25 mg total) by mouth once daily. 90 tablet 11    simvastatin (ZOCOR)  40 MG tablet Take 1 tablet (40 mg total) by mouth every evening. 90 tablet 11    temazepam (RESTORIL) 15 mg Cap       TRAVATAN Z 0.004 % Drop        No current facility-administered medications on file prior to visit.          Review of patient's allergies indicates:  No Known Allergies          Review of Systems   Constitution: Negative for chills, fever and night sweats.   Cardiovascular: Positive for leg swelling. Negative for chest pain.   Skin: Positive for poor wound healing.   Musculoskeletal: Negative for joint pain and joint swelling.   Gastrointestinal: Positive for constipation (off and on).   Neurological: Positive for numbness and seizures.   Psychiatric/Behavioral: Positive for altered mental status and memory loss.           Objective:        Vitals:    11/14/17 1448   BP: 109/75   Pulse: 89   Weight: 63 kg (139 lb)   Height: 5' (1.524 m)             Physical Exam   Constitutional: She appears well-developed and well-nourished. No distress.     Cardiovascular: Normal rate.  Dorsalis pedis and posterior tibial pulses are non-palpable Bilaterally. Toes are cool to touch. Feet are warm proximally.There is decreased digital hair. Skin is very atrophic, hyperpigmented, and mildly edematous.     Musculoskeletal: Normal range of motion. Edema: mild-moderate in feet. Absent right fifth toe due to amputation. Wheelchair bound due to lower extremity weakness, generalized.      Area of mild focal edema noted to dorsal aspect of the right 5th metatarsal--appears to be retracted EDL tendon from previous 5th toe amp. No pain on palpation or manipulation. Stable     Neurological: She is alert. She exhibits normal muscle tone.   Neurologic: [place]-Pop 5.07 monofilamant testing is absent Pankaj feet. Sharp/dull sensation absent Bilaterally. Light touch absent Bilaterally.     Skin: Skin is warm and dry. No rash noted. She is not diaphoretic. No erythema. No pallor.     Skin shiny, thin, atrophic with loss of  pedal hair b/l.   Toenails 1-5 L and 1-4 R elongated, dystrophic, discolored with subungual debris. Hyperkeratotic lesion noted to lateral distal aspect of b/l 5th digits adjacent to toenails. Stable without open wounds at 5th toes.     Hyperkeratotic lesion noted to distal tip of R great toe with underlying superficial wound with dry hematoma. Down to dermal layer only. Stable with no SOI.             Assessment / Plan:       Ramona was seen today for pcp and follow-up.    Diagnoses and all orders for this visit:    PAD (peripheral artery disease)    History of amputation of lesser toe of right foot    Bilateral lower extremity edema    Onychomycosis due to dermatophyte    Pre-ulcerative calluses        Wound cleaned with sterile normal saline. No excisional debridement at this time. Using sterile 5mm curette, overlying hyperkeratosis trimmed down to dermal layer. No bleeding encountered. Tolerated well. Betadine applied and bandaid.  Change daily at home with betadine. Advised not to wrap toe with any dressing. Apply loose bandaid over toe, do not wrap around toe. Ok to leave open at times.     Protect feet to prevent injury   Monitor wound daily for problems. Return immediately if any signs of infection are encountered including pus, drainage, odor, severe pain, fever, chills.     With patient's permission, nails were aggressively reduced and debrided 1,2,3,4, R and 1,2, 3,4,5 L and filed to their soft tissue attachment mechanically and with electric , removing all offending nail and debris. Patient tolerated this well and no blood was drawn. Patient reports relief following the procedure.     Utilize Darco shoe at all times for added protection. New ones for each foot provided today.     F/u 9 weeks, sooner PRN for routine follow up. Monitor toe daily. Return sooner/call immediatly if any problems arise. With hx of PAD, patient high risk for losing toe if this becomes infected. Will keep close eye on toe.  She has had previous vascular interventions. Improvement noted. Continue local care for now.

## 2018-01-09 ENCOUNTER — IMMUNIZATION (OUTPATIENT)
Dept: INTERNAL MEDICINE | Facility: CLINIC | Age: 83
End: 2018-01-09
Payer: MEDICARE

## 2018-01-09 ENCOUNTER — OFFICE VISIT (OUTPATIENT)
Dept: INTERNAL MEDICINE | Facility: CLINIC | Age: 83
End: 2018-01-09
Payer: MEDICARE

## 2018-01-09 VITALS — DIASTOLIC BLOOD PRESSURE: 60 MMHG | HEART RATE: 76 BPM | SYSTOLIC BLOOD PRESSURE: 120 MMHG

## 2018-01-09 DIAGNOSIS — I10 ESSENTIAL HYPERTENSION: Primary | ICD-10-CM

## 2018-01-09 DIAGNOSIS — I73.9 PAD (PERIPHERAL ARTERY DISEASE): ICD-10-CM

## 2018-01-09 DIAGNOSIS — I63.9 CEREBROVASCULAR ACCIDENT (CVA), UNSPECIFIED MECHANISM: ICD-10-CM

## 2018-01-09 DIAGNOSIS — F03.91 DEMENTIA WITH BEHAVIORAL DISTURBANCE, UNSPECIFIED DEMENTIA TYPE: ICD-10-CM

## 2018-01-09 DIAGNOSIS — Z23 NEED FOR STREPTOCOCCUS PNEUMONIAE AND INFLUENZA VACCINATION: ICD-10-CM

## 2018-01-09 PROCEDURE — 90670 PCV13 VACCINE IM: CPT | Mod: GW,S$GLB,, | Performed by: INTERNAL MEDICINE

## 2018-01-09 PROCEDURE — 90662 IIV NO PRSV INCREASED AG IM: CPT | Mod: GW,S$GLB,, | Performed by: INTERNAL MEDICINE

## 2018-01-09 PROCEDURE — G0009 ADMIN PNEUMOCOCCAL VACCINE: HCPCS | Mod: GW,S$GLB,, | Performed by: INTERNAL MEDICINE

## 2018-01-09 PROCEDURE — 99999 PR PBB SHADOW E&M-EST. PATIENT-LVL III: CPT | Mod: PBBFAC,,, | Performed by: INTERNAL MEDICINE

## 2018-01-09 PROCEDURE — 99213 OFFICE O/P EST LOW 20 MIN: CPT | Mod: GW,S$GLB,, | Performed by: INTERNAL MEDICINE

## 2018-01-09 PROCEDURE — G0008 ADMIN INFLUENZA VIRUS VAC: HCPCS | Mod: GW,S$GLB,, | Performed by: INTERNAL MEDICINE

## 2018-01-09 NOTE — PROGRESS NOTES
Subjective:       Patient ID: Ramona Grijalva is a 89 y.o. female.    Chief Complaint: Follow-up    L side hip bone protrudes, no focal pains.    No new other symptoms.    Feet are stable, has f/u with podiatry frequ.      Review of Systems   Constitutional: Negative for activity change, fatigue, fever and unexpected weight change.   HENT: Negative for congestion.    Respiratory: Negative for cough, choking, chest tightness and shortness of breath.    Cardiovascular: Negative for chest pain, palpitations and leg swelling.   Gastrointestinal: Negative for abdominal distention and abdominal pain.   Genitourinary: Negative for decreased urine volume.   Musculoskeletal: Positive for gait problem. Negative for arthralgias and back pain.   Skin: Negative for rash.   Neurological: Positive for weakness (from strokes).   Psychiatric/Behavioral: Positive for agitation (easily calms down) and confusion. Negative for behavioral problems and dysphoric mood.       Objective:      Physical Exam   Constitutional: She appears well-developed and well-nourished. No distress.   Calm in wheelchair, minimal responses to questions, unintelligible   HENT:   Head: Normocephalic and atraumatic.   Mouth/Throat: No oropharyngeal exudate.   Eyes: EOM are normal. Pupils are equal, round, and reactive to light. Left eye exhibits no discharge.   Cardiovascular: Normal rate, regular rhythm and normal heart sounds.    Pulmonary/Chest: Effort normal and breath sounds normal.   Abdominal: Soft. Bowel sounds are normal. She exhibits no distension. There is no tenderness.   Musculoskeletal:   No focal tenderness along L greater troch, no wound at site.   Neurological: She is alert.   L sided hemiparesis, mild R sided as well, but L>R.    Does not speak for herself.     unintelligible   Skin: No rash noted. She is not diaphoretic.   Psychiatric: She has a normal mood and affect. Her behavior is normal.       Assessment:       1. Need for Streptococcus  pneumoniae and influenza vaccination        Plan:       Here for f/u.    Ramona was seen today for follow-up.    HTN controlled.    Stroke prophy -- on ASA, statin, has BP control.      Diagnoses and all orders for this visit:    Need for Streptococcus pneumoniae and influenza vaccination  -     (In Office Administered) Pneumococcal Conjugate Vaccine (13 Valent) (IM)  Flu vax please        Health Maintenance       Date Due Completion Date    TETANUS VACCINE 01/20/1938 ---    Zoster Vaccine 01/20/1980 ---    Pneumococcal (65+) (2 of 2 - PPSV23) 01/09/2019 1/9/2018    Lipid Panel 06/15/2022 6/15/2017          Return in about 6 months (around 7/9/2018).

## 2018-04-24 ENCOUNTER — TELEPHONE (OUTPATIENT)
Dept: INTERNAL MEDICINE | Facility: CLINIC | Age: 83
End: 2018-04-24

## 2018-04-24 DIAGNOSIS — I63.9 CEREBROVASCULAR ACCIDENT (CVA), UNSPECIFIED MECHANISM: ICD-10-CM

## 2018-04-24 DIAGNOSIS — I73.9 PAD (PERIPHERAL ARTERY DISEASE): ICD-10-CM

## 2018-04-24 DIAGNOSIS — I73.9 CLAUDICATION: ICD-10-CM

## 2018-04-24 DIAGNOSIS — I73.9 PVD (PERIPHERAL VASCULAR DISEASE): ICD-10-CM

## 2018-04-24 DIAGNOSIS — I10 ESSENTIAL HYPERTENSION: ICD-10-CM

## 2018-04-24 NOTE — TELEPHONE ENCOUNTER
----- Message from Basia Braswell sent at 4/24/2018 12:38 PM CDT -----  Contact: daughter  Doctor appointment and lab have been scheduled.  Please link lab orders to the lab appointment.  Date of doctor appointment:  6/19  Physical or EP:  phys  Date of lab appointment:  6/12  Comments:

## 2018-06-12 ENCOUNTER — LAB VISIT (OUTPATIENT)
Dept: LAB | Facility: HOSPITAL | Age: 83
End: 2018-06-12
Attending: INTERNAL MEDICINE
Payer: MEDICARE

## 2018-06-12 DIAGNOSIS — I73.9 PAD (PERIPHERAL ARTERY DISEASE): ICD-10-CM

## 2018-06-12 DIAGNOSIS — I63.9 CEREBROVASCULAR ACCIDENT (CVA), UNSPECIFIED MECHANISM: ICD-10-CM

## 2018-06-12 DIAGNOSIS — I73.9 CLAUDICATION: ICD-10-CM

## 2018-06-12 DIAGNOSIS — I73.9 PVD (PERIPHERAL VASCULAR DISEASE): ICD-10-CM

## 2018-06-12 DIAGNOSIS — I10 ESSENTIAL HYPERTENSION: ICD-10-CM

## 2018-06-12 LAB
ALBUMIN SERPL BCP-MCNC: 2.9 G/DL
ALP SERPL-CCNC: 80 U/L
ALT SERPL W/O P-5'-P-CCNC: 6 U/L
ANION GAP SERPL CALC-SCNC: 10 MMOL/L
AST SERPL-CCNC: 19 U/L
BILIRUB SERPL-MCNC: 0.4 MG/DL
BUN SERPL-MCNC: 16 MG/DL
CALCIUM SERPL-MCNC: 9 MG/DL
CHLORIDE SERPL-SCNC: 110 MMOL/L
CHOLEST SERPL-MCNC: 136 MG/DL
CHOLEST/HDLC SERPL: 3.1 {RATIO}
CO2 SERPL-SCNC: 20 MMOL/L
CREAT SERPL-MCNC: 0.9 MG/DL
EST. GFR  (AFRICAN AMERICAN): >60 ML/MIN/1.73 M^2
EST. GFR  (NON AFRICAN AMERICAN): 53 ML/MIN/1.73 M^2
ESTIMATED AVG GLUCOSE: 108 MG/DL
GLUCOSE SERPL-MCNC: 79 MG/DL
HBA1C MFR BLD HPLC: 5.4 %
HDLC SERPL-MCNC: 44 MG/DL
HDLC SERPL: 32.4 %
LDLC SERPL CALC-MCNC: 82.6 MG/DL
NONHDLC SERPL-MCNC: 92 MG/DL
POTASSIUM SERPL-SCNC: 4.2 MMOL/L
PROT SERPL-MCNC: 6.8 G/DL
SODIUM SERPL-SCNC: 140 MMOL/L
TRIGL SERPL-MCNC: 47 MG/DL

## 2018-06-12 PROCEDURE — 36415 COLL VENOUS BLD VENIPUNCTURE: CPT

## 2018-06-12 PROCEDURE — 80061 LIPID PANEL: CPT

## 2018-06-12 PROCEDURE — 80053 COMPREHEN METABOLIC PANEL: CPT

## 2018-06-12 PROCEDURE — 83036 HEMOGLOBIN GLYCOSYLATED A1C: CPT

## 2018-06-19 ENCOUNTER — OFFICE VISIT (OUTPATIENT)
Dept: INTERNAL MEDICINE | Facility: CLINIC | Age: 83
End: 2018-06-19
Payer: MEDICARE

## 2018-06-19 VITALS — HEIGHT: 65 IN | SYSTOLIC BLOOD PRESSURE: 122 MMHG | DIASTOLIC BLOOD PRESSURE: 80 MMHG

## 2018-06-19 DIAGNOSIS — I10 ESSENTIAL HYPERTENSION: ICD-10-CM

## 2018-06-19 DIAGNOSIS — I73.9 PAD (PERIPHERAL ARTERY DISEASE): ICD-10-CM

## 2018-06-19 DIAGNOSIS — M24.549 CONTRACTURE OF JOINT OF HAND, UNSPECIFIED LATERALITY: ICD-10-CM

## 2018-06-19 DIAGNOSIS — L89.151 DECUBITUS ULCER OF SACRAL REGION, STAGE 1: ICD-10-CM

## 2018-06-19 DIAGNOSIS — K59.00 CONSTIPATION, UNSPECIFIED CONSTIPATION TYPE: Primary | ICD-10-CM

## 2018-06-19 DIAGNOSIS — I63.9 CEREBROVASCULAR ACCIDENT (CVA), UNSPECIFIED MECHANISM: ICD-10-CM

## 2018-06-19 DIAGNOSIS — F03.91 DEMENTIA WITH BEHAVIORAL DISTURBANCE, UNSPECIFIED DEMENTIA TYPE: ICD-10-CM

## 2018-06-19 DIAGNOSIS — Z89.421 HISTORY OF AMPUTATION OF LESSER TOE OF RIGHT FOOT: ICD-10-CM

## 2018-06-19 PROBLEM — L97.521 SKIN ULCER OF TOE OF LEFT FOOT, LIMITED TO BREAKDOWN OF SKIN: Status: RESOLVED | Noted: 2017-09-07 | Resolved: 2018-06-19

## 2018-06-19 PROCEDURE — 99214 OFFICE O/P EST MOD 30 MIN: CPT | Mod: GW,S$GLB,, | Performed by: INTERNAL MEDICINE

## 2018-06-19 PROCEDURE — 99999 PR PBB SHADOW E&M-EST. PATIENT-LVL III: CPT | Mod: PBBFAC,,, | Performed by: INTERNAL MEDICINE

## 2018-06-19 RX ORDER — POLYETHYLENE GLYCOL 3350 17 G/17G
17 POWDER, FOR SOLUTION ORAL DAILY
Qty: 850 G | Refills: 11 | Status: SHIPPED | OUTPATIENT
Start: 2018-06-19

## 2018-06-19 NOTE — MEDICAL/APP STUDENT
Subjective:       Patient ID: Ramona Grijalva is a 89 y.o. female.    Chief Complaint: Annual Exam    89 yo F with PMHx of hypertension, stroke, and dementia presents with her daughter Cece for a routine physical exam.  As per Cece, patient is relatively unchanged from previous visit.  Cece is requesting additional assistance for the care of Ms. Grijalva as it has become more difficult in the past few months for her to manage her.    Patient has been experiencing left knee pain, which Cece attributes to arthritis.  Patient takes aspirin as needed, but it is unclear if that helps with the pain.    Patient also has a pressure ulcer on the right hip, causing her to avoid that area and slide off wherever she is sitting.    Last, patient has been experiencing increased constipation that requires a greater amount of lactulose to assist with.      Review of Systems   Constitutional: Negative for fever and unexpected weight change.   HENT: Negative for sinus pressure.    Cardiovascular: Negative for leg swelling.   Gastrointestinal: Positive for constipation. Negative for abdominal pain, diarrhea and vomiting.   Genitourinary: Negative for flank pain.   Musculoskeletal: Positive for arthralgias.       Objective:      Physical Exam   Constitutional: She appears well-developed. No distress.   HENT:   Head: Normocephalic and atraumatic.   Eyes: No scleral icterus.   Cardiovascular: Normal rate and regular rhythm.    Pulmonary/Chest: Effort normal.   Abdominal: Soft. She exhibits no distension.   Musculoskeletal: Normal range of motion. She exhibits no edema or deformity.   Left knee excessively contracted at rest. Tone greater than right, mild clonus       Assessment:       1. Constipation, unspecified constipation type        Plan:       Routine exam  - Daughter consulted on patient assistance - talk to hospice doctor for greater range of care  - Will need chair lift for disability  - Followup in 6 months    Right hip pressure  ulcer  - Daughter consulted on to followup with hospice nurse for proper wound care    Myalgia of the Knee  - DDx of osteoarthritis, more likely due to prolonged contracture of muscles of left knee secondary to stroke  - Continue Aspirin as needed, discussed muscle relaxants with daughter, daughter declined at this time    Constipation  - Discussed with daughter daily usage of miralax for more regular bowel movements  - Return or contact on my [facility] if symptoms due not improve or worsen

## 2018-06-19 NOTE — PROGRESS NOTES
Subjective:       Patient ID: Ramona Grijalva is a 89 y.o. female.    Chief Complaint: Annual Exam    Patient is here for followup for chronic conditions.    Here with dtr Cece.    Has dced arimidex.    L knee pain, will take ASA.    L hip protrusion still. dtr concerned about a sacral pressure sore.    Requests lift chair from [employer].    Some constipation, when she gets backed up appetite goes away.      Review of Systems   Constitutional: Negative for activity change, fatigue, fever and unexpected weight change.   HENT: Negative for congestion.    Respiratory: Negative for cough, choking, chest tightness and shortness of breath.    Cardiovascular: Negative for chest pain, palpitations and leg swelling.   Gastrointestinal: Negative for abdominal distention and abdominal pain.   Genitourinary: Negative for decreased urine volume.   Musculoskeletal: Positive for gait problem. Negative for arthralgias and back pain.   Skin: Positive for wound (R sacral). Negative for rash.   Neurological: Positive for weakness (from strokes).   Psychiatric/Behavioral: Positive for agitation (easily calms down) and confusion. Negative for behavioral problems and dysphoric mood.       Objective:      Physical Exam   Constitutional: She appears well-developed and well-nourished. No distress.   Calm in wheelchair, minimal responses to questions, unintelligible   HENT:   Head: Normocephalic and atraumatic.   Mouth/Throat: No oropharyngeal exudate.   Eyes: EOM are normal. Pupils are equal, round, and reactive to light. Left eye exhibits no discharge.   Cardiovascular: Normal rate, regular rhythm and normal heart sounds.    Pulmonary/Chest: Effort normal and breath sounds normal.   Abdominal: Soft. Bowel sounds are normal. She exhibits no distension. There is no tenderness.   Genitourinary:   Genitourinary Comments: R sided sacral decub superficial skin breakdown, no SOI   Musculoskeletal:   No focal tenderness along L greater troch, no  wound at site.    R sided sacral decub superficial skin breakdown, no SOI   Neurological: She is alert.   L sided hemiparesis, mild R sided as well, but L>R.    Does not speak for herself.     unintelligible   Skin: No rash noted. She is not diaphoretic.   Psychiatric: She has a normal mood and affect. Her behavior is normal.       Assessment:       1. Constipation, unspecified constipation type    2. Essential hypertension    3. History of amputation of lesser toe of right foot    4. Contracture of joint of hand, unspecified laterality    5. Cerebrovascular accident (CVA), unspecified mechanism    6. Dementia with behavioral disturbance, unspecified dementia type    7. PAD (peripheral artery disease)        Plan:         Ora was seen today for annual exam.    Diagnoses and all orders for this visit:    Constipation, unspecified constipation type  -     polyethylene glycol (GLYCOLAX) 17 gram/dose powder; Take 17 g by mouth once daily.  -     HME - OTHER    Essential hypertension  -     polyethylene glycol (GLYCOLAX) 17 gram/dose powder; Take 17 g by mouth once daily.  -     HME - OTHER  controlled    History of amputation of lesser toe of right foot  -     polyethylene glycol (GLYCOLAX) 17 gram/dose powder; Take 17 g by mouth once daily.  -     HME - OTHER  Needs lift chair    Contracture of joint of hand, unspecified laterality  -     polyethylene glycol (GLYCOLAX) 17 gram/dose powder; Take 17 g by mouth once daily.  offered muscle relaxer, dtr declines    Cerebrovascular accident (CVA), unspecified mechanism  -     polyethylene glycol (GLYCOLAX) 17 gram/dose powder; Take 17 g by mouth once daily.  -     HME - OTHER    Dementia with behavioral disturbance, unspecified dementia type  -     polyethylene glycol (GLYCOLAX) 17 gram/dose powder; Take 17 g by mouth once daily.  -     HME - OTHER    PAD (peripheral artery disease)  -     polyethylene glycol (GLYCOLAX) 17 gram/dose powder; Take 17 g by mouth once daily.  -      HME - OTHER    Decubitus ulcer of sacral region, stage 1  Has home nurse from hospice, nurse will check it out tomorrow and advise how to cover it and track it      Health Maintenance       Date Due Completion Date    TETANUS VACCINE 01/20/1938 ---    Zoster Vaccine 01/20/1980 ---    Influenza Vaccine 08/01/2018 1/9/2018    Pneumococcal (65+) (2 of 2 - PPSV23) 01/09/2019 1/9/2018    Lipid Panel 06/12/2023 6/12/2018          Follow-up in about 6 months (around 12/19/2018). unless primary care through hospice

## 2018-06-19 NOTE — LETTER
June 19, 2018    Ramona Grijalva  [address] [place] [id number]             [facility]  [address]  [place] [address]  Phone: [phone]  Fax: [phone] Dear Ms. GrijalvaTo whom it may concern:    This letter is to certify that I am the above named patient's primary care doctor.    It is medically necessary that Ms. Ramona Grijalva  has a lift chair since she needs 2 person assist for transfers, she is not able to transfer on her own due to previous strokes.    If you have any questions or concerns, please don't hesitate to call.    Sincerely,        Delonte Santos MD

## 2018-06-19 NOTE — Clinical Note
Hi, please fax letter of necessity and order for lift chair to -- [facility] , their fax number for the [facility] patients is [phone] Thank you, Delonte Santos

## 2018-06-20 ENCOUNTER — TELEPHONE (OUTPATIENT)
Dept: INTERNAL MEDICINE | Facility: CLINIC | Age: 83
End: 2018-06-20

## 2018-06-20 NOTE — TELEPHONE ENCOUNTER
Your PA has been faxed to the plan as a paper copy. Please contact the plan directly if you haven't received a determination in a typical timeframe.    You will be notified of the determination via fax. # [id number]

## 2018-06-20 NOTE — TELEPHONE ENCOUNTER
----- Message from Rajni Bustillo sent at 6/20/2018  8:59 AM CDT -----  Contact: [facility]  Prior Authorization Needed    Medication: polyethylene glycol (GLYCOLAX) 17 gram/dose powder    Pharmacy Info: [id number] [id number] [place] - [address]    Plan does not cover this medication. Please call plan at [phone] to initiate prior authorization or call/fax pharmacy to change medication. Patient ID#[id number]    Note chart when prior authorization has been submitted.    Please notify pharmacy when prior authorization has been approved.    Thank You

## 2018-07-29 DIAGNOSIS — I63.9 CEREBROVASCULAR ACCIDENT (CVA), UNSPECIFIED MECHANISM: ICD-10-CM

## 2018-07-30 RX ORDER — SIMVASTATIN 40 MG/1
TABLET, FILM COATED ORAL
Qty: 90 TABLET | Refills: 0 | Status: SHIPPED | OUTPATIENT
Start: 2018-07-30 | End: 2018-10-25 | Stop reason: SDUPTHER

## 2018-08-13 ENCOUNTER — OFFICE VISIT (OUTPATIENT)
Dept: PODIATRY | Facility: CLINIC | Age: 83
End: 2018-08-13
Payer: MEDICARE

## 2018-08-13 VITALS
DIASTOLIC BLOOD PRESSURE: 66 MMHG | HEART RATE: 81 BPM | HEIGHT: 65 IN | SYSTOLIC BLOOD PRESSURE: 106 MMHG | BODY MASS INDEX: 23.13 KG/M2

## 2018-08-13 DIAGNOSIS — I73.9 PAD (PERIPHERAL ARTERY DISEASE): Primary | ICD-10-CM

## 2018-08-13 DIAGNOSIS — B35.1 ONYCHOMYCOSIS DUE TO DERMATOPHYTE: ICD-10-CM

## 2018-08-13 DIAGNOSIS — Z89.421 H/O AMPUTATION OF LESSER TOE, RIGHT: ICD-10-CM

## 2018-08-13 DIAGNOSIS — L84 CORN OR CALLUS: ICD-10-CM

## 2018-08-13 PROCEDURE — 99999 PR PBB SHADOW E&M-EST. PATIENT-LVL III: CPT | Mod: PBBFAC,,, | Performed by: PODIATRIST

## 2018-08-13 PROCEDURE — 11056 PARNG/CUTG B9 HYPRKR LES 2-4: CPT | Mod: GW,Q7,S$GLB, | Performed by: PODIATRIST

## 2018-08-13 PROCEDURE — 11721 DEBRIDE NAIL 6 OR MORE: CPT | Mod: GW,59,Q7,S$GLB | Performed by: PODIATRIST

## 2018-08-13 PROCEDURE — 99499 UNLISTED E&M SERVICE: CPT | Mod: S$GLB,,, | Performed by: PODIATRIST

## 2018-08-13 NOTE — PROGRESS NOTES
Subjective:     Ramona Grijalva is a 89 y.o. female  who presents to the clinic for evaluation and treatment of high risk feet. Ramona has a past medical history of Anticoagulant long-term use; Aortic valve sclerosis; Arthritis; Breast cancer; Claudication; Cough; Dizziness; Hypertension; Seizures; Stroke; Tachycardia; and URI (upper respiratory infection). The patient's chief complaint is follow up for routine high risk foot exam. Has elongated toenails in need of trimming today. Also has hx of left great toe wound which family member states is stable. Would like to have this checked today. No other pedal complaints today.         PCP:  Delonte Santos MD   Date Last Seen by PCP:  Chief Complaint   Patient presents with    Foot Problem     PCP Dr. Santos 6/19/18    Nail Care    Nail Problem       Shoe Gear: darco shoes with socks.        Past Medical History:   Diagnosis Date    Anticoagulant long-term use     Aortic valve sclerosis     Arthritis     Breast cancer     right breast    Claudication     RIGHT LEG BLOCKAGE    Cough     Dementia     Dizziness     Hypertension     Seizures     Stroke     L sided hemiparesis    Tachycardia     URI (upper respiratory infection)          Current Outpatient Medications on File Prior to Visit   Medication Sig Dispense Refill    bisacodyl (DULCOLAX) 10 mg Supp Place 10 mg rectally daily as needed.        cadexomer iodine (IODOSORB) 0.9 % gel Apply topically daily as needed for Wound Care. 40 g 2    lactulose (CHRONULAC) 10 gram/15 mL solution TAKE 30ml BY MOUTH DAILY as needed FOR CONSTIPATION  0    losartan (COZAAR) 25 MG tablet Take 1 tablet (25 mg total) by mouth once daily. 90 tablet 11    polyethylene glycol (GLYCOLAX) 17 gram/dose powder Take 17 g by mouth once daily. 850 g 11    simvastatin (ZOCOR) 40 MG tablet TAKE 1 TABLET(40 MG) BY MOUTH EVERY EVENING 90 tablet 0    temazepam (RESTORIL) 15 mg Cap 15 mg nightly as needed.       TRAVATAN Z 0.004 % Drop  "1 drop every evening.       [DISCONTINUED] aspirin (ECOTRIN) 325 MG EC tablet Take 325 mg by mouth once daily.         No current facility-administered medications on file prior to visit.          Review of patient's allergies indicates:  No Known Allergies          Review of Systems   Constitution: Negative for chills, fever and night sweats.   Cardiovascular: Positive for leg swelling. Negative for chest pain.   Skin: Positive for poor wound healing.   Musculoskeletal: Negative for joint pain and joint swelling.   Gastrointestinal: Positive for constipation (off and on).   Neurological: Positive for numbness and seizures.   Psychiatric/Behavioral: Positive for altered mental status and memory loss.           Objective:        Vitals:    08/13/18 1342   BP: 106/66   Pulse: 81   Height: 5' 5" (1.651 m)             Physical Exam   Constitutional: She appears well-developed and well-nourished. No distress.     Cardiovascular: Normal rate.  Dorsalis pedis and posterior tibial pulses are non-palpable Bilaterally. Toes are cool to touch. Feet are warm proximally.There is decreased digital hair. Skin is very atrophic, hyperpigmented, and mildly edematous.     Musculoskeletal: Normal range of motion. Edema: mild-moderate in feet. Absent right fifth toe due to amputation. Wheelchair bound due to lower extremity weakness, generalized.      Area of mild focal edema noted to dorsal aspect of the right 5th metatarsal--appears to be retracted EDL tendon from previous 5th toe amp. No pain on palpation or manipulation. Stable     Neurological: She is alert. She exhibits normal muscle tone.   Neurologic: [place]-Pop 5.07 monofilamant testing is absent Pankaj feet. Sharp/dull sensation absent Bilaterally. Light touch absent Bilaterally.     Skin: Skin is warm and dry. No rash noted. She is not diaphoretic. No erythema. No pallor.     Skin shiny, thin, atrophic with loss of pedal hair b/l.   Toenails 1-5 L and 1-4 R elongated, " dystrophic, discolored with subungual debris. Hyperkeratotic lesion noted to lateral distal aspect of b/l 5th digits adjacent to toenails. Stable without open wounds at 5th toes.     Hyperkeratotic lesion noted to distal tip of L great toe with no further underlying wound. Another hyperkeratotic lesion noted to R hallux plantar medial IPJ. Skin lines present, no SOI or signs of deep tissue injury.             Assessment / Plan:       Orrajat was seen today for foot problem, nail care and nail problem.    Diagnoses and all orders for this visit:    PAD (peripheral artery disease)    H/O amputation of lesser toe, right    Onychomycosis due to dermatophyte    Corn or callus      - Shoe inspection. General Foot Education. Patient reminded of the importance of good nutrition. Patient instructed on proper foot hygeine. We discussed wearing proper shoe gear, daily foot inspections, never walking without protective shoe gear, caution putting sharp instruments to feet     - Discussed general foot care:  Wear comfortable, proper fitting shoes. Wash feet daily. Dry well. After drying, apply moisturizer to feet (no lotion to webspaces). Inspect feet daily for skin breaks, blisters, swelling, or redness. Wear cotton socks (preferably white)  Change socks every day. Do NOT walk barefoot. Do NOT use heating pads or warm/hot water soaks     Protect feet to prevent injury   Monitor toes daily for problems/wounds etc. Return immediately if any signs of infection are encountered including pus, drainage, odor, severe pain, fever, chills.     The affected area was cleansed with an alcohol prep pad. Next, utilizing a No.15 scalpel, the hyperkeratotic tissues were trimmed from distal L great toe and plantar medial hallux IPJ R, down to appropriate level of skin. Care was taken to remove any nucleated core from the center of the lesion. No pinpoint bleeding was encountered. The patient tolerated relief following this procedure.     With  patient's permission, nails were aggressively reduced and debrided 1,2,3,4, R and 1,2, 3,4,5 L and filed to their soft tissue attachment mechanically and with electric , removing all offending nail and debris. Patient tolerated this well and no blood was drawn. Patient reports relief following the procedure.     Utilize Darco shoe at all times for added protection.      F/u 9 weeks, sooner PRN for routine follow up. Monitor toes daily. Return sooner/call immediatly if any problems arise.

## 2018-08-24 DIAGNOSIS — I10 ESSENTIAL HYPERTENSION: ICD-10-CM

## 2018-08-24 DIAGNOSIS — I63.9 CEREBROVASCULAR ACCIDENT (CVA), UNSPECIFIED MECHANISM: ICD-10-CM

## 2018-08-24 RX ORDER — LOSARTAN POTASSIUM 25 MG/1
TABLET ORAL
Qty: 90 TABLET | Refills: 11 | Status: SHIPPED | OUTPATIENT
Start: 2018-08-24

## 2018-10-25 DIAGNOSIS — I63.9 CEREBROVASCULAR ACCIDENT (CVA), UNSPECIFIED MECHANISM: ICD-10-CM

## 2018-10-25 RX ORDER — SIMVASTATIN 40 MG/1
TABLET, FILM COATED ORAL
Qty: 90 TABLET | Refills: 3 | Status: SHIPPED | OUTPATIENT
Start: 2018-10-25

## 2021-05-17 ENCOUNTER — TELEPHONE (OUTPATIENT)
Dept: INTERNAL MEDICINE | Facility: CLINIC | Age: 86
End: 2021-05-17